# Patient Record
(demographics unavailable — no encounter records)

---

## 2025-02-14 NOTE — PHYSICAL EXAM
[de-identified] : foreskin pulls back easily, no irritations, tears, phimotic band present [Chaperone Present] : A chaperone was present in the examining room during all aspects of the physical examination [FreeTextEntry2] : Ross ZAMUDIO

## 2025-02-14 NOTE — HISTORY OF PRESENT ILLNESS
[FreeTextEntry1] : Name JEN PATTERSON  MRN [id number]   1993  Contact Number: [phone] ----------------------------------------------------------------------------------------------------------------------------------------- Date of First Visit: 25 Referring Provider/PCP: none -----------------------------------------------------------------------------------------------------------------------------------------  CC: foreskin irritation  History of Present Illness: JEN PATTERSON is a 31 year old male who presents for evaluation foreskin irritation with intercourse. This has been an issue for years - years ago saw a urologist and was given a cream, unsure if helped at the time, but also reports lubrication helped. Has been waxing and waning over this time, but feels more bothersome recently. Foreskin is tight when erect and pulls back in intercourse and then will tear. No discharge. No significant glans irritation. Is able to retract foreskin but reports tight and can tear from that. No issues with urination.  PMH: none PSH: none Family History: no  malignancies Social: , no children, /army, active smoker  ppd x 9 years, occasional alcohol, no recreational drugs Meds: none Allergies: NKDA ROS: no fevers, chills, dysuria, flank pain

## 2025-02-14 NOTE — ASSESSMENT
[FreeTextEntry1] : 31 year old with phimotic band - foreskin retracts but tight when erect and causes tears during intercourse. Tried betamethasone in the past, but unsure if really helped. Options discussed - 1. observation, 2. trial of stronger steroids or 3. circumcisions - discussed risks including bleeding, infection, hematoma, need for revision, injury to surrounding structures. At this time patient would like to trial clotrimazole bid x 1 month - will d/c if irritation occurs. Plan for 6 week f/u to determine progress.  Plan: - clotrimazole 0.05% bid x 1 month - fu 6 weeks